# Patient Record
Sex: MALE | Employment: STUDENT | URBAN - METROPOLITAN AREA
[De-identification: names, ages, dates, MRNs, and addresses within clinical notes are randomized per-mention and may not be internally consistent; named-entity substitution may affect disease eponyms.]

---
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
## 2020-08-28 ENCOUNTER — TRANSFERRED RECORDS (OUTPATIENT)
Dept: HEALTH INFORMATION MANAGEMENT | Facility: CLINIC | Age: 20
End: 2020-08-28

## 2020-08-29 ENCOUNTER — TRANSFERRED RECORDS (OUTPATIENT)
Dept: HEALTH INFORMATION MANAGEMENT | Facility: CLINIC | Age: 20
End: 2020-08-29

## 2020-08-29 LAB — EJECTION FRACTION: 52 %

## 2020-08-30 ENCOUNTER — TRANSFERRED RECORDS (OUTPATIENT)
Dept: HEALTH INFORMATION MANAGEMENT | Facility: CLINIC | Age: 20
End: 2020-08-30

## 2022-01-17 DIAGNOSIS — Z13.6 SCREENING FOR HEART DISEASE: Primary | ICD-10-CM

## 2022-01-17 DIAGNOSIS — Z13.0 SCREENING FOR SICKLE-CELL DISEASE OR TRAIT: ICD-10-CM

## 2022-01-18 ENCOUNTER — OFFICE VISIT (OUTPATIENT)
Dept: FAMILY MEDICINE | Facility: CLINIC | Age: 22
End: 2022-01-18
Payer: COMMERCIAL

## 2022-01-18 VITALS
BODY MASS INDEX: 36.45 KG/M2 | WEIGHT: 315 LBS | SYSTOLIC BLOOD PRESSURE: 124 MMHG | HEART RATE: 83 BPM | HEIGHT: 78 IN | DIASTOLIC BLOOD PRESSURE: 81 MMHG

## 2022-01-18 DIAGNOSIS — Z02.5 SPORTS PHYSICAL: Primary | ICD-10-CM

## 2022-01-18 ASSESSMENT — MIFFLIN-ST. JEOR: SCORE: 2625.12

## 2022-01-18 NOTE — LETTER
"  1/18/2022      RE: Lucio Sawant  [address]  [place] [address]       Lucio Sawant   Presents for PPE for football  No concerns  physically  Vitals: /81   Pulse 83   Ht 1.981 m (6' 6\")   Wt 149.7 kg (330 lb)   BMI 38.14 kg/m    BMI= Body mass index is 38.14 kg/m .  Sport(s): Football    Vision: Right Eye: 20/20 Left Eye: 20/20 Both Eyes: 20/20  Correction: none  Pupils: equal    Sickle Cell Trait: Discussed and Patient accepted Sickle Cell Trait testing  Concussions: Concussion fact sheet reviewed. Student Athlete gave written and verbal agreement to report any suspected concussions.    General/Medical  Eyes/Vision: Normal  Ears/Hearing: Normal  Nose: Normal  Mouth/Dental: Normal  Throat: Normal  Thyroid: Normal  Lymph Nodes: Normal  Lungs: Normal  Abdomen: Normal    Skin: Normal        Cardiovascular Screening  RRR  Heart Murmur:No Grade: NA  Symmetric Femoral pulses: Yes    Stigmata of Marfan's Syndrome - if appropriate:  Not applicable    Assessment/Plan  20 yo male with sports physical  Cleared  Reviewed records      COMMENTS, RECOMMENDATIONS and PARTICIPATION STATUS  Cleared  Dr Zachery Bingham MD    "

## 2022-01-18 NOTE — LETTER
Date:February 3, 2022      Patient was self referred, no letter generated. Do not send.        [facility] Health Information

## 2022-01-26 ENCOUNTER — DOCUMENTATION ONLY (OUTPATIENT)
Dept: FAMILY MEDICINE | Facility: CLINIC | Age: 22
End: 2022-01-26

## 2022-01-26 NOTE — PROGRESS NOTES
[facility]    Physical Therapy initial assessment note  Treating therapist: Kai Avalos PT, DPT   Date of service performed: January 26, 2022  Sport: Football     Concern: Chronic injury- previous stinger   Body part: Neck  Description: Left  Injury: Strain  Type: Athletics related  Date of injury: unable to specify date- last season     S: reports intermittent numbness and tingling down left arm into elbow. No symptoms past elbow.     O: cervical extension provocative for left sided neck pain and radicular symptoms (C5-T2). Prone PA to lower cervical and upper thoracic eliminated radicular symptoms with looking up.   -Positive Spurling's to the L   -Gross UE MMT: 5/5   -Upper Trap (prone T): L- 4/5 , R-5/5  -Lower trap (prone Y): L- 4/5, R- 5/5\   -significant upper trap compensated noted in this position on left.     Tender trigger points noted in left upper trap and levator scap.     A: mobility restrictions present as well as hypertonic levator and upper trap. Continue with mobility exercises. Assess radicular symptoms next week following daily exercise.     P: improve lower cervical and upper thoracic extension mobility. Continue to monitor frequency of radicular symptoms. Scapular stabilization emphasis with cervical/thoracic ROM.      Exercises Given:   -Upper trap stretch 3 x 20s   -Prone Ts (3#) 2 x 10   -Cervical retractions 2 x 10   -CTJ Extension with towel x 10     Jose Guadalupe Avalos PT

## 2022-01-31 ENCOUNTER — DOCUMENTATION ONLY (OUTPATIENT)
Dept: FAMILY MEDICINE | Facility: CLINIC | Age: 22
End: 2022-01-31

## 2022-01-31 NOTE — PROGRESS NOTES
[facility]    Physical Therapy follow-up note  Treating therapist: Kai Avalos DPT, MARCO    Date of service performed: January 31, 2022    Concern/injury: chronic neck stinger    Subjective: improvement in neck pain and radicular symptoms. Less symptoms into the arm today.     Objective: improved CTJ mobility with extension. TTP noted in left upper trap and levator.   -Performed prone C6-T3 PA mobs     Assessment/plan: continue with lower cervical and thoracic mobility. Follow up next week to see if radicular symptoms continue to improve.   -Rehab Exercises performed:     -Upper trap stretch 3 x 20s   -Prone Ts (3#) 2 x 10   -Cervical retractions 2 x 10   -CTJ Extension with towel x 10    -Rehab Exercises added: cervical retraction with extension 2 x 10     Jose Guadalupe Xiao PT

## 2022-02-03 NOTE — PROGRESS NOTES
"Lucio Sawant   Presents for PPE for football  No concerns  physically  Vitals: /81   Pulse 83   Ht 1.981 m (6' 6\")   Wt 149.7 kg (330 lb)   BMI 38.14 kg/m    BMI= Body mass index is 38.14 kg/m .  Sport(s): Football    Vision: Right Eye: 20/20 Left Eye: 20/20 Both Eyes: 20/20  Correction: none  Pupils: equal    Sickle Cell Trait: Discussed and Patient accepted Sickle Cell Trait testing  Concussions: Concussion fact sheet reviewed. Student Athlete gave written and verbal agreement to report any suspected concussions.    General/Medical  Eyes/Vision: Normal  Ears/Hearing: Normal  Nose: Normal  Mouth/Dental: Normal  Throat: Normal  Thyroid: Normal  Lymph Nodes: Normal  Lungs: Normal  Abdomen: Normal    Skin: Normal        Cardiovascular Screening  RRR  Heart Murmur:No Grade: NA  Symmetric Femoral pulses: Yes    Stigmata of Marfan's Syndrome - if appropriate:  Not applicable    Assessment/Plan  22 yo male with sports physical  Cleared  Reviewed records      COMMENTS, RECOMMENDATIONS and PARTICIPATION STATUS  Cleared  Dr Bingham  "

## 2022-02-07 ENCOUNTER — DOCUMENTATION ONLY (OUTPATIENT)
Dept: FAMILY MEDICINE | Facility: CLINIC | Age: 22
End: 2022-02-07

## 2022-02-07 NOTE — PROGRESS NOTES
[facility]THLET[facility]    Physical Therapy follow-up note  Treating therapist: Kai Avalos DPCHANDNI    Date of service performed: February 7, 2022    Concern/injury: neck stinger    Subjective: continued improvement in radicular symptoms. Only into upper trap at this point and rather infrequent     Objective: cervical retraction and extension continue to improve. Hypomobility in lower cervical and upper thoracic     Assessment/plan: continue to emphasize lower trap and middle trap strength. Progression into prone Y for HEP.   -Rehab Exercises performed:   1) supine therapist over pressure Ts 2 x 10 each   2) Prone PA joint mobs x 5 min   3) Exercise ball Y's 2# 2 x 15   **continue with cervical retractions, CTJ extension with towel for home program     -Rehab Exercises added: exercise ball Y's 2 x 15 with challenging weight     Follow up: as needed or if regression in symptoms. Radicular has mostly resolved, has some tightness in upper traps but will continue to work on periscapular strength at home and in training room     Jose Guadalupe Avalos, PT

## 2022-02-14 ENCOUNTER — DOCUMENTATION ONLY (OUTPATIENT)
Dept: FAMILY MEDICINE | Facility: CLINIC | Age: 22
End: 2022-02-14

## 2022-02-14 NOTE — PROGRESS NOTES
[facility]THLET[facility]    Physical Therapy follow-up note  Treating therapist: Kai Avalos DPT    Date of service performed: February 14, 2022    Concern/injury: neck stinger    Subjective: increased stiffness noted today; feels he slept wrong last few days     Objective: decreased cervical extension with retraction. Tightness and possible radicular symptoms noted into left upper trap to shoulder; no tightness or radicular following treatment     Assessment/plan: continue to follow up with PT as symptoms present themselves. Continue with mobility program and periscapular strength     -Rehab Exercises performed: prone scapular T's ; supine PA's C5-T8 with emphasis on CTJ     -Rehab Exercises added: none added for YANELIS Avalos, PT

## 2022-10-02 ENCOUNTER — OFFICE VISIT (OUTPATIENT)
Dept: ORTHOPEDICS | Facility: CLINIC | Age: 22
End: 2022-10-02
Payer: COMMERCIAL

## 2022-10-02 DIAGNOSIS — G89.29 CHRONIC PAIN OF LEFT KNEE: Primary | ICD-10-CM

## 2022-10-02 DIAGNOSIS — M25.562 CHRONIC PAIN OF LEFT KNEE: Primary | ICD-10-CM

## 2022-10-02 NOTE — LETTER
Date:October 5, 2022      Patient was self referred, no letter generated. Do not send.        [facility] Health Information

## 2022-10-02 NOTE — PROGRESS NOTES
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  He fell directly onto his left bent knee in the football game yesterday.  He may have also had a player fell onto the back of his knee while on the ground.  He was able to finish the game.  Today he is having anterior lateral left knee pain.  He denies any instability.  Mild stiffness through range of motion.  Denies any previous injury or surgery to that side.    O:  PE:  LLE: Free and painless range of motion of the hip.  0 to 140 degrees of knee flexion.  Mild edema.  No significant effusion.  5/5 knee flexion and extension.  Ia Lachman.  Stable anterior posterior drawer.  Stable to varus and valgus stress at 0 and 30 degrees of knee flexion.  No pain to palpation over the medial or lateral joint line.  No pain to palpation over the femoral or tibial insertions of the MCL.  No pain to palpation over the femoral or fibular insertion of the LCL.  Mild pain to palpation over the anterior lateral knee and prepatellar bursa.    A/P: Patient is a 22-year-old male who presents today with a left prepatellar contusion.  Stable ligamentous exam.  Plan to move forward with rehab with our athletic training staff.  Cleared to return to play.  We will continue to follow through the by week.    Preston Valenzuela MD

## 2022-10-02 NOTE — LETTER
10/2/2022      RE: Lucio Sawant  [address] [place] [id number]     Dear Colleague,    Thank you for referring your patient, Lucio Sawant, to the [facility]. Please see a copy of my visit note below.    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  He fell directly onto his left bent knee in the football game yesterday.  He may have also had a player fell onto the back of his knee while on the ground.  He was able to finish the game.  Today he is having anterior lateral left knee pain.  He denies any instability.  Mild stiffness through range of motion.  Denies any previous injury or surgery to that side.    O:  PE:  LLE: Free and painless range of motion of the hip.  0 to 140 degrees of knee flexion.  Mild edema.  No significant effusion.  5/5 knee flexion and extension.  Ia Lachman.  Stable anterior posterior drawer.  Stable to varus and valgus stress at 0 and 30 degrees of knee flexion.  No pain to palpation over the medial or lateral joint line.  No pain to palpation over the femoral or tibial insertions of the MCL.  No pain to palpation over the femoral or fibular insertion of the LCL.  Mild pain to palpation over the anterior lateral knee and prepatellar bursa.    A/P: Patient is a 22-year-old male who presents today with a left prepatellar contusion.  Stable ligamentous exam.  Plan to move forward with rehab with our athletic training staff.  Cleared to return to play.  We will continue to follow through the by week.    Preston Valenzuela MD    Patient seen and examined with the fellow. I also personally reviewed the images and interpreted the imaging myself.     Assesment: Left knee pain following a fall directly onto his knee, anterior medial contusion.  PCL intact.    Plan: Knee is stable to examination.  Ice anti-inflammatory, play when able    I agree with history, physical and imaging as well as the  assessment and plan as detailed by Dr. Valenzuela.         Again, thank you for allowing me to participate in the care of your patient.      Sincerely,    Santos Bernabe MD

## 2022-10-04 ENCOUNTER — OFFICE VISIT (OUTPATIENT)
Dept: ORTHOPEDICS | Facility: CLINIC | Age: 22
End: 2022-10-04
Payer: COMMERCIAL

## 2022-10-04 VITALS — WEIGHT: 315 LBS | BODY MASS INDEX: 36.45 KG/M2 | HEIGHT: 78 IN

## 2022-10-04 DIAGNOSIS — M54.50 ACUTE BILATERAL LOW BACK PAIN, UNSPECIFIED WHETHER SCIATICA PRESENT: Primary | ICD-10-CM

## 2022-10-04 RX ORDER — DICLOFENAC SODIUM 75 MG/1
75 TABLET, DELAYED RELEASE ORAL 2 TIMES DAILY
Qty: 60 TABLET | Refills: 0 | Status: SHIPPED | OUTPATIENT
Start: 2022-10-04

## 2022-10-04 NOTE — LETTER
Date:October 5, 2022      Patient was self referred, no letter generated. Do not send.        [facility] Health Information

## 2022-10-04 NOTE — LETTER
10/4/2022      RE: Lucio Sawant  [address] [place] [address] [place] [address]     Dear Colleague,    Thank you for referring your patient, Lucio Sawant, to the [facility]. Please see a copy of my visit note below.    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  While lifting weights 2 days ago he sustained an injury to his low back.  He had no trauma.  He feels like it is paraspinous in nature.  He has no radicular symptoms.    Exam today shows stiffness to forward flexion and extension.  Stiffness to right left sidebending.  Rotation is not as bad.  Normal lower extremity motor exam with 5-5 hip flexors gastrocsoleus EHL FHL quadriceps hamstrings tibialis anterior.  Normal reflexes.  Normal sensation.    Clinical assessment: Low back strain    Plan: Long discussion with the athlete.  Start him on diclofenac as well as a muscle relaxant.  This could make him sleepy.  He needs to be aware of it.  Follow-up through the training room as necessary.  He is cleared to play when able.      Again, thank you for allowing me to participate in the care of your patient.      Sincerely,    Santos Bernabe MD

## 2022-10-04 NOTE — PROGRESS NOTES
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  While lifting weights 2 days ago he sustained an injury to his low back.  He had no trauma.  He feels like it is paraspinous in nature.  He has no radicular symptoms.    Exam today shows stiffness to forward flexion and extension.  Stiffness to right left sidebending.  Rotation is not as bad.  Normal lower extremity motor exam with 5-5 hip flexors gastrocsoleus EHL FHL quadriceps hamstrings tibialis anterior.  Normal reflexes.  Normal sensation.    Clinical assessment: Low back strain    Plan: Long discussion with the athlete.  Start him on diclofenac as well as a muscle relaxant.  This could make him sleepy.  He needs to be aware of it.  Follow-up through the training room as necessary.  He is cleared to play when able.

## 2022-10-04 NOTE — PROGRESS NOTES
Patient seen and examined with the fellow. I also personally reviewed the images and interpreted the imaging myself.     Assesment: Left knee pain following a fall directly onto his knee, anterior medial contusion.  PCL intact.    Plan: Knee is stable to examination.  Ice anti-inflammatory, play when able    I agree with history, physical and imaging as well as the assessment and plan as detailed by Dr. Valenzuela.

## 2022-11-13 ENCOUNTER — TELEPHONE (OUTPATIENT)
Dept: ORTHOPEDICS | Facility: CLINIC | Age: 22
End: 2022-11-13

## 2022-11-13 DIAGNOSIS — M54.50 ACUTE BILATERAL LOW BACK PAIN WITHOUT SCIATICA: Primary | ICD-10-CM

## 2022-11-13 RX ORDER — TIZANIDINE HYDROCHLORIDE 4 MG/1
4 CAPSULE, GELATIN COATED ORAL 3 TIMES DAILY
Qty: 30 CAPSULE | Refills: 0 | Status: SHIPPED | OUTPATIENT
Start: 2022-11-13

## 2022-11-13 RX ORDER — DICLOFENAC SODIUM 75 MG/1
75 TABLET, DELAYED RELEASE ORAL 2 TIMES DAILY
Qty: 30 TABLET | Refills: 0 | Status: SHIPPED | OUTPATIENT
Start: 2022-11-13

## 2022-11-13 RX ORDER — KETOROLAC TROMETHAMINE 10 MG/1
10 TABLET, FILM COATED ORAL EVERY 6 HOURS PRN
Qty: 5 TABLET | Refills: 0 | Status: SHIPPED | OUTPATIENT
Start: 2022-11-13

## 2022-11-13 NOTE — TELEPHONE ENCOUNTER
Called by atc for low back strain.    Will start Diclofenac and tizanidine now and order PO toradol for game days    F/u through training room this week, clear to play

## 2023-08-03 ENCOUNTER — TELEPHONE (OUTPATIENT)
Dept: SPORTS MEDICINE | Facility: CLINIC | Age: 23
End: 2023-08-03

## 2023-08-03 ENCOUNTER — HOSPITAL ENCOUNTER (OUTPATIENT)
Dept: RADIOLOGY | Facility: HOSPITAL | Age: 23
Discharge: HOME | End: 2023-08-03
Attending: ORTHOPAEDIC SURGERY

## 2023-08-03 DIAGNOSIS — M25.521 RIGHT ELBOW PAIN: Primary | ICD-10-CM

## 2023-08-03 DIAGNOSIS — M25.521 RIGHT ELBOW PAIN: ICD-10-CM

## 2023-08-03 PROCEDURE — 73080 X-RAY EXAM OF ELBOW: CPT | Mod: 26,RT,, | Performed by: RADIOLOGY

## 2023-08-03 PROCEDURE — 73080 XR ELBOW COMPLETE 3 VIEW RIGHT: ICD-10-PCS | Mod: 26,RT,, | Performed by: RADIOLOGY

## 2023-08-03 PROCEDURE — 73080 X-RAY EXAM OF ELBOW: CPT | Mod: TC,RT
